# Patient Record
Sex: FEMALE | Race: WHITE | URBAN - METROPOLITAN AREA
[De-identification: names, ages, dates, MRNs, and addresses within clinical notes are randomized per-mention and may not be internally consistent; named-entity substitution may affect disease eponyms.]

---

## 2017-05-15 ENCOUNTER — IMPORTED ENCOUNTER (OUTPATIENT)
Dept: URBAN - METROPOLITAN AREA CLINIC 50 | Facility: CLINIC | Age: 77
End: 2017-05-15

## 2017-11-06 ENCOUNTER — IMPORTED ENCOUNTER (OUTPATIENT)
Dept: URBAN - METROPOLITAN AREA CLINIC 50 | Facility: CLINIC | Age: 77
End: 2017-11-06

## 2018-03-09 ENCOUNTER — IMPORTED ENCOUNTER (OUTPATIENT)
Dept: URBAN - METROPOLITAN AREA CLINIC 50 | Facility: CLINIC | Age: 78
End: 2018-03-09

## 2018-07-20 ENCOUNTER — IMPORTED ENCOUNTER (OUTPATIENT)
Dept: URBAN - METROPOLITAN AREA CLINIC 50 | Facility: CLINIC | Age: 78
End: 2018-07-20

## 2019-01-10 ENCOUNTER — IMPORTED ENCOUNTER (OUTPATIENT)
Dept: URBAN - METROPOLITAN AREA CLINIC 50 | Facility: CLINIC | Age: 79
End: 2019-01-10

## 2019-01-25 ENCOUNTER — IMPORTED ENCOUNTER (OUTPATIENT)
Dept: URBAN - METROPOLITAN AREA CLINIC 50 | Facility: CLINIC | Age: 79
End: 2019-01-25

## 2019-07-02 ENCOUNTER — IMPORTED ENCOUNTER (OUTPATIENT)
Dept: URBAN - METROPOLITAN AREA CLINIC 50 | Facility: CLINIC | Age: 79
End: 2019-07-02

## 2019-07-02 NOTE — PATIENT DISCUSSION
"""s/p ECP."" ""Reassured patient that intraocular pressures (IOPs) are at target levels and other ocular findings are stable. Continue present management and/or medication(s).  Follow up as directed. """

## 2020-01-07 ENCOUNTER — IMPORTED ENCOUNTER (OUTPATIENT)
Dept: URBAN - METROPOLITAN AREA CLINIC 50 | Facility: CLINIC | Age: 80
End: 2020-01-07

## 2020-02-04 ENCOUNTER — IMPORTED ENCOUNTER (OUTPATIENT)
Dept: URBAN - METROPOLITAN AREA CLINIC 50 | Facility: CLINIC | Age: 80
End: 2020-02-04

## 2020-03-11 NOTE — PATIENT DISCUSSION
Rx Doxycycline 100 mg PO q 12 hrs for 2 weeks.  Rx Maxitrol ointment bid as directed.  Rx Warm compresses qid.

## 2020-04-14 NOTE — PROCEDURE NOTE: CLINICAL
PROCEDURE NOTE: Bandage Contact Lens #1 OS. Diagnosis: Corneal Abrasion. A therapeutic soft contact lens of the of the appropriate size and base curve was selected and then applied to the cornea. The lens fit well and moved appropriately. Krysta Olivas

## 2020-04-16 NOTE — PATIENT DISCUSSION
Advised to call immediately if worsening eye pain or loss of vision. AT every hour for now and then add priya 128 ARIS at night. removed BCL today. call with any changes.

## 2020-06-30 NOTE — PROCEDURE NOTE: CLINICAL
PROCEDURE NOTE: SLT #2 OU. Diagnosis: Glaucoma Suspect, High Risk. Anesthesia: Topical. Prep: Alphagan 0.15%. Prior to treatment, risks/benefits/alternatives discussed including infection, loss of vision, hemorrhage, cataract, glaucoma, retinal tears or detachment. Lens:  SLT laser lens with goniosol. Power: 1.2/1.2mJ. Total applications: 741/34. Application 131/675 degrees. Patient tolerated procedure well. There were no complications. Post-op instructions given. Post-op IOP = 15/13 mmHg. Rubia Main PROCEDURE NOTE: SLT OU. Diagnosis: Glaucoma Suspect, High Risk. Prior to treatment, risks/benefits/alternatives discussed including infection, loss of vision, hemorrhage, cataract, glaucoma, retinal tears or detachment. Lens:  SLT laser lens with goniosol. Power: *mJ. Total applications: *. Application * degrees. Patient tolerated procedure well. There were no complications. Post-op instructions given. Post-op IOP = * mmHg. Rubia Main

## 2020-08-06 ENCOUNTER — IMPORTED ENCOUNTER (OUTPATIENT)
Dept: URBAN - METROPOLITAN AREA CLINIC 50 | Facility: CLINIC | Age: 80
End: 2020-08-06

## 2020-08-18 ENCOUNTER — IMPORTED ENCOUNTER (OUTPATIENT)
Dept: URBAN - METROPOLITAN AREA CLINIC 50 | Facility: CLINIC | Age: 80
End: 2020-08-18

## 2020-08-20 ENCOUNTER — IMPORTED ENCOUNTER (OUTPATIENT)
Dept: URBAN - METROPOLITAN AREA CLINIC 50 | Facility: CLINIC | Age: 80
End: 2020-08-20

## 2021-02-01 NOTE — PROCEDURE NOTE: CLINICAL
PROCEDURE NOTE: Punctal Plugs, 0.4 mm Quintess Dissolvable (37980U, ) #1 Left Lower Lid. Prior to treatment, the risks/benefits/alternatives were discussed. The patient wished to proceed with procedure. Temporary collagen plugs were inserted. Patient tolerated procedure well. There were no complications. Post procedure instructions given. Diya Harding PROCEDURE NOTE: Punctal Plugs, Quintess Dissolvable ([id number], [id number]) #1 Right Lower Lid. Prior to treatment, the risks/benefits/alternatives were discussed. The patient wished to proceed with procedure. Temporary collagen plugs were inserted. Patient tolerated procedure well. There were no complications. Post procedure instructions given. Diya Schulenburg

## 2021-02-18 ENCOUNTER — IMPORTED ENCOUNTER (OUTPATIENT)
Dept: URBAN - METROPOLITAN AREA CLINIC 50 | Facility: CLINIC | Age: 81
End: 2021-02-18

## 2021-03-09 ENCOUNTER — IMPORTED ENCOUNTER (OUTPATIENT)
Dept: URBAN - METROPOLITAN AREA CLINIC 50 | Facility: CLINIC | Age: 81
End: 2021-03-09

## 2021-03-15 ENCOUNTER — IMPORTED ENCOUNTER (OUTPATIENT)
Dept: URBAN - METROPOLITAN AREA CLINIC 50 | Facility: CLINIC | Age: 81
End: 2021-03-15

## 2021-04-18 ASSESSMENT — TONOMETRY
OS_IOP_MMHG: 20
OD_IOP_MMHG: 24
OS_IOP_MMHG: 15
OD_IOP_MMHG: 19
OS_IOP_MMHG: 15
OD_IOP_MMHG: 16
OS_IOP_MMHG: 18
OS_IOP_MMHG: 16
OD_IOP_MMHG: 19
OD_IOP_MMHG: 16
OD_IOP_MMHG: 20
OD_IOP_MMHG: 18
OD_IOP_MMHG: 19
OS_IOP_MMHG: 14
OS_IOP_MMHG: 19
OS_IOP_MMHG: 20
OD_IOP_MMHG: 17
OD_IOP_MMHG: 18
OS_IOP_MMHG: 18
OS_IOP_MMHG: 18
OS_IOP_MMHG: 17
OS_IOP_MMHG: 17
OS_IOP_MMHG: 19
OD_IOP_MMHG: 19
OS_IOP_MMHG: 20
OD_IOP_MMHG: 20
OS_IOP_MMHG: 17
OD_IOP_MMHG: 18
OS_IOP_MMHG: 17
OD_IOP_MMHG: 17
OS_IOP_MMHG: 20
OD_IOP_MMHG: 22
OS_IOP_MMHG: 13
OD_IOP_MMHG: 20
OD_IOP_MMHG: 18
OD_IOP_MMHG: 15
OS_IOP_MMHG: 17
OS_IOP_MMHG: 19
OS_IOP_MMHG: 18
OD_IOP_MMHG: 21
OD_IOP_MMHG: 23
OD_IOP_MMHG: 18
OD_IOP_MMHG: 19
OD_IOP_MMHG: 15
OS_IOP_MMHG: 18
OS_IOP_MMHG: 19
OS_IOP_MMHG: 16

## 2021-04-18 ASSESSMENT — PACHYMETRY
OD_CT_UM: 528
OD_CT_UM: 528
OS_CT_UM: 511
OD_CT_UM: 528
OS_CT_UM: 511
OD_CT_UM: 528
OS_CT_UM: 511
OD_CT_UM: 528
OS_CT_UM: 511
OD_CT_UM: 528
OD_CT_UM: 528
OS_CT_UM: 511
OD_CT_UM: 528
OS_CT_UM: 511
OD_CT_UM: 528
OS_CT_UM: 511

## 2021-04-18 ASSESSMENT — VISUAL ACUITY
OS_SC: 20/30
OS_SC: 20/30
OD_SC: 20/30-1
OD_CC: J1+@ 16 IN
OS_SC: 20/30
OD_SC: 20/25-
OD_SC: 20/30
OD_CC: J1+
OS_SC: 20/30+1
OS_SC: 20/40
OS_CC: J1+@ 16 IN
OD_SC: 20/30
OD_SC: 20/25
OD_CC: J1
OS_SC: 20/30+
OS_SC: 20/30-1
OD_SC: 20/25
OS_PH: 20/25-
OD_SC: 20/30
OS_CC: J1
OS_SC: 20/30
OS_PH: 20/30+2
OS_CC: J1+
OD_PH: 20/30+
OS_SC: 20/30
OD_SC: 20/40
OD_SC: 20/30
OS_SC: 20/30+2
OS_CC: J2@ 13 IN
OD_CC: J2@ 13 IN
OD_CC: J1
OD_SC: 20/25-1
OS_CC: J1
OD_SC: 20/30
OS_SC: 20/30

## 2021-08-09 NOTE — PROCEDURE NOTE: CLINICAL
PROCEDURE NOTE: Punctal Plugs, 0.4 mm Quintess Dissolvable ([id number], ) #2 Left Lower Lid. Prior to treatment, the risks/benefits/alternatives were discussed. The patient wished to proceed with procedure. Temporary collagen plugs were inserted. Patient tolerated procedure well. There were no complications. Post procedure instructions given. Samanta Umana PROCEDURE NOTE: Punctal Plugs, 0.4 mm Quintess Dissolvable ([id number], ) #2 Right Lower Lid. Prior to treatment, the risks/benefits/alternatives were discussed. The patient wished to proceed with procedure. Temporary collagen plugs were inserted. Patient tolerated procedure well. There were no complications. Post procedure instructions given. Samanta Umana

## 2021-08-11 ENCOUNTER — PREPPED CHART (OUTPATIENT)
Dept: URBAN - METROPOLITAN AREA CLINIC 49 | Facility: CLINIC | Age: 81
End: 2021-08-11

## 2021-08-12 ENCOUNTER — 6 MONTH COMPREHENSIVE EXAM (OUTPATIENT)
Dept: URBAN - METROPOLITAN AREA CLINIC 49 | Facility: CLINIC | Age: 81
End: 2021-08-12

## 2021-08-12 DIAGNOSIS — H40.1131: ICD-10-CM

## 2021-08-12 DIAGNOSIS — D31.31: ICD-10-CM

## 2021-08-12 PROCEDURE — 92133 CPTRZD OPH DX IMG PST SGM ON: CPT

## 2021-08-12 PROCEDURE — 92014 COMPRE OPH EXAM EST PT 1/>: CPT

## 2021-08-12 PROCEDURE — 92083 EXTENDED VISUAL FIELD XM: CPT

## 2021-08-12 ASSESSMENT — VISUAL ACUITY
OS_SC: 20/40+2
OD_SC: 20/40+2
OU_SC: 20/30-1

## 2021-08-12 ASSESSMENT — TONOMETRY
OD_IOP_MMHG: 17
OS_IOP_MMHG: 17
OS_IOP_MMHG: 19
OD_IOP_MMHG: 18

## 2021-08-24 ENCOUNTER — DIAGNOSTIC TESTING ONLY (OUTPATIENT)
Dept: URBAN - METROPOLITAN AREA CLINIC 48 | Facility: CLINIC | Age: 81
End: 2021-08-24

## 2021-08-24 DIAGNOSIS — H47.233: ICD-10-CM

## 2021-08-24 PROCEDURE — 92273 FULL FIELD ERG W/I&R: CPT

## 2021-09-23 ENCOUNTER — 1 MONTH FOLLOW-UP (OUTPATIENT)
Dept: URBAN - METROPOLITAN AREA CLINIC 49 | Facility: CLINIC | Age: 81
End: 2021-09-23

## 2021-09-23 DIAGNOSIS — H40.1131: ICD-10-CM

## 2021-09-23 PROCEDURE — 92012 INTRM OPH EXAM EST PATIENT: CPT

## 2021-09-23 ASSESSMENT — TONOMETRY
OD_IOP_MMHG: 17
OS_IOP_MMHG: 15
OD_IOP_MMHG: 16
OS_IOP_MMHG: 17

## 2021-09-23 ASSESSMENT — VISUAL ACUITY
OS_SC: 20/30
OD_SC: 20/30

## 2022-02-03 ENCOUNTER — FOLLOW UP (OUTPATIENT)
Dept: URBAN - METROPOLITAN AREA CLINIC 49 | Facility: CLINIC | Age: 82
End: 2022-02-03

## 2022-02-03 DIAGNOSIS — H40.1131: ICD-10-CM

## 2022-02-03 PROCEDURE — 92020 GONIOSCOPY: CPT

## 2022-02-03 PROCEDURE — 92012 INTRM OPH EXAM EST PATIENT: CPT

## 2022-02-03 ASSESSMENT — VISUAL ACUITY
OD_SC: 20/30-
OS_SC: 20/30-

## 2022-02-03 ASSESSMENT — TONOMETRY
OS_IOP_MMHG: 18
OS_IOP_MMHG: 16
OD_IOP_MMHG: 16
OD_IOP_MMHG: 17

## 2022-06-28 ENCOUNTER — DIAGNOSTICS ONLY (OUTPATIENT)
Dept: URBAN - METROPOLITAN AREA CLINIC 49 | Facility: CLINIC | Age: 82
End: 2022-06-28

## 2022-06-28 DIAGNOSIS — H40.1131: ICD-10-CM

## 2022-06-28 PROCEDURE — 92083 EXTENDED VISUAL FIELD XM: CPT

## 2022-06-28 PROCEDURE — 92133 CPTRZD OPH DX IMG PST SGM ON: CPT

## 2022-08-10 NOTE — PROCEDURE NOTE: CLINICAL
PROCEDURE NOTE: Punctal Plugs, 0.3 mm Collagen #1 Left Lower Lid. Prior to treatment, the risks/benefits/alternatives were discussed. The patient wished to proceed with procedure. One drop of proparacaine was placed and the a drop of lidocaine gel was placed over the puncta. Puncta was dilated with punctal dilator. Placed a collagen plug. Size/location of plugs inserted: *. Patient tolerated procedure well. There were no complications. Post-op instructions given. Erica Bucio PROCEDURE NOTE: Punctal Plugs, 0.3 mm Collagen #1 Right Lower Lid. Prior to treatment, the risks/benefits/alternatives were discussed. The patient wished to proceed with procedure. One drop of proparacaine was placed and the a drop of lidocaine gel was placed over the puncta. Puncta was dilated with punctal dilator. Placed a collagen plug. Size/location of plugs inserted: *. Patient tolerated procedure well. There were no complications. Post-op instructions given. Erica Bucio

## 2022-08-11 ENCOUNTER — COMPREHENSIVE EXAM (OUTPATIENT)
Dept: URBAN - METROPOLITAN AREA CLINIC 49 | Facility: CLINIC | Age: 82
End: 2022-08-11

## 2022-08-11 DIAGNOSIS — H40.1131: ICD-10-CM

## 2022-08-11 DIAGNOSIS — H47.012: ICD-10-CM

## 2022-08-11 DIAGNOSIS — D31.31: ICD-10-CM

## 2022-08-11 DIAGNOSIS — H35.3131: ICD-10-CM

## 2022-08-11 PROCEDURE — 92134 CPTRZ OPH DX IMG PST SGM RTA: CPT

## 2022-08-11 PROCEDURE — 92014 COMPRE OPH EXAM EST PT 1/>: CPT

## 2022-08-11 ASSESSMENT — TONOMETRY
OS_IOP_MMHG: 14
OS_IOP_MMHG: 16
OD_IOP_MMHG: 15
OD_IOP_MMHG: 14

## 2022-08-11 ASSESSMENT — VISUAL ACUITY
OD_SC: 20/30-1
OD_GLARE: 20/40
OD_GLARE: 20/200
OU_SC: J2
OS_SC: 20/30
OS_GLARE: 20/100
OS_GLARE: 20/30

## 2022-08-11 NOTE — PATIENT DISCUSSION
The IOP is in the target range. Continue current Latanoprost and Timolol drop therapy. Follow up in 6 months for IOP check.

## 2022-08-11 NOTE — PATIENT DISCUSSION
Patient does not have high blood pressure or diabetes. Patient was seen by PCP yesterday and her labs were normal. Slight APD OS noted today. Slight optic nerve hyperemia OS. No decrease in vision. Patient denies pain. No tenderness to palpation on temporal scalp. Discussed the patient possible need for steroids. Will re-evaluate in 1 week. Will see patient un dilated then will be dilated after un dilated view with Dr. Walsh Drop Schedule [facility] ASAP.

## 2022-08-18 ENCOUNTER — FOLLOW UP (OUTPATIENT)
Dept: URBAN - METROPOLITAN AREA CLINIC 49 | Facility: CLINIC | Age: 82
End: 2022-08-18

## 2022-08-18 DIAGNOSIS — H40.1131: ICD-10-CM

## 2022-08-18 DIAGNOSIS — H47.012: ICD-10-CM

## 2022-08-18 DIAGNOSIS — H26.493: ICD-10-CM

## 2022-08-18 PROCEDURE — 92014 COMPRE OPH EXAM EST PT 1/>: CPT

## 2022-08-18 PROCEDURE — 66821 AFTER CATARACT LASER SURGERY: CPT

## 2022-08-18 PROCEDURE — 92083 EXTENDED VISUAL FIELD XM: CPT

## 2022-08-18 ASSESSMENT — VISUAL ACUITY
OS_SC: 20/30-1
OD_SC: 20/30-1

## 2022-08-18 ASSESSMENT — TONOMETRY
OD_IOP_MMHG: 14
OS_IOP_MMHG: 14
OS_IOP_MMHG: 16
OD_IOP_MMHG: 15

## 2022-08-18 NOTE — PROCEDURE NOTE: CLINICAL
PROCEDURE NOTE: YAG Capsulotomy OS. Diagnosis: Posterior Capsular Opacification (PCO). Prep: Mydriacil 1% and Phenylephrine 2.5%. Prior to treatment, the risks/benefits/alternatives were discussed. The patient wished to proceed with procedure. Consent was signed. Proparacaine and brominidine were placed into the operative eye after the eye was dilated. Power = 1.1mJ. Number of pulses = 62. Patient tolerated procedure well and there were no complications. Post Laser instructions given. Jenni Beebe

## 2022-08-18 NOTE — PATIENT DISCUSSION
PCO (NO [place]):  PCO is not visually significant. Educated patient on signs and symptoms of PCO. Continue to monitor at this time.

## 2022-09-08 ENCOUNTER — POST-OP (OUTPATIENT)
Dept: URBAN - METROPOLITAN AREA CLINIC 49 | Facility: CLINIC | Age: 82
End: 2022-09-08

## 2022-09-08 DIAGNOSIS — H26.491: ICD-10-CM

## 2022-09-08 PROCEDURE — 66821 AFTER CATARACT LASER SURGERY: CPT

## 2022-09-08 ASSESSMENT — VISUAL ACUITY
OS_SC: 20/30-1
OU_SC: 20/40+2
OD_SC: 20/40+2

## 2022-09-08 ASSESSMENT — TONOMETRY
OD_IOP_MMHG: 15
OS_IOP_MMHG: 13
OS_IOP_MMHG: 15
OD_IOP_MMHG: 14

## 2022-09-08 NOTE — PATIENT DISCUSSION
PCO ([id number] [place] [id number]): Visually significant PCO present on exam today. Recommend YAG laser capsulotomy to improve vision and decrease glare symptoms. RBAs of procedure discussed. Patient agrees and wishes to proceed.

## 2022-09-08 NOTE — PROCEDURE NOTE: CLINICAL
PROCEDURE NOTE: YAG Capsulotomy OD. Diagnosis: Posterior Capsular Opacification (PCO). Prep: Mydriacil 1% and Phenylephrine 2.5%. Prior to treatment, the risks/benefits/alternatives were discussed. The patient wished to proceed with procedure. Consent was signed. Proparacaine and brominidine were placed into the operative eye after the eye was dilated. Power = 1.2mJ. Number of pulses = 60. Patient tolerated procedure well and there were no complications. Post Laser instructions given. Jessica Chirinos

## 2022-09-08 NOTE — PATIENT DISCUSSION
Discussed BP Control, UV protection and dark leafy green vegetables. work related/environmental exposure

## 2022-09-22 ENCOUNTER — POST-OP (OUTPATIENT)
Dept: URBAN - METROPOLITAN AREA CLINIC 49 | Facility: CLINIC | Age: 82
End: 2022-09-22

## 2022-09-22 DIAGNOSIS — Z98.890: ICD-10-CM

## 2022-09-22 DIAGNOSIS — H40.1131: ICD-10-CM

## 2022-09-22 PROCEDURE — 92015 DETERMINE REFRACTIVE STATE: CPT

## 2022-09-22 PROCEDURE — 99024 POSTOP FOLLOW-UP VISIT: CPT

## 2022-09-22 ASSESSMENT — TONOMETRY
OD_IOP_MMHG: 13
OS_IOP_MMHG: 16
OS_IOP_MMHG: 14
OD_IOP_MMHG: 14

## 2022-09-22 ASSESSMENT — VISUAL ACUITY
OS_SC: 20/30-2
OD_SC: 20/30-1

## 2023-09-14 ENCOUNTER — FOLLOW UP (OUTPATIENT)
Dept: URBAN - METROPOLITAN AREA CLINIC 49 | Facility: LOCATION | Age: 83
End: 2023-09-14

## 2023-09-14 DIAGNOSIS — H40.1131: ICD-10-CM

## 2023-09-14 DIAGNOSIS — H10.13: ICD-10-CM

## 2023-09-14 PROCEDURE — 92133 CPTRZD OPH DX IMG PST SGM ON: CPT

## 2023-09-14 PROCEDURE — 99213 OFFICE O/P EST LOW 20 MIN: CPT

## 2023-09-14 PROCEDURE — 92083 EXTENDED VISUAL FIELD XM: CPT

## 2023-09-14 RX ORDER — DORZOLAMIDE 20 MG/ML
1 SOLUTION/ DROPS OPHTHALMIC TWICE A DAY
Start: 2023-09-14

## 2023-09-14 RX ORDER — OLOPATADINE HYDROCHLORIDE 2 MG/ML: 1 SOLUTION OPHTHALMIC EVERY MORNING

## 2023-09-14 ASSESSMENT — VISUAL ACUITY
OU_SC: 20/40
OS_SC: 20/40
OD_SC: 20/40

## 2023-09-14 ASSESSMENT — TONOMETRY
OS_IOP_MMHG: 13
OD_IOP_MMHG: 15
OD_IOP_MMHG: 16
OS_IOP_MMHG: 15

## 2023-12-14 ENCOUNTER — COMPREHENSIVE EXAM (OUTPATIENT)
Dept: URBAN - METROPOLITAN AREA CLINIC 49 | Facility: LOCATION | Age: 83
End: 2023-12-14

## 2023-12-14 DIAGNOSIS — D31.31: ICD-10-CM

## 2023-12-14 DIAGNOSIS — H40.1131: ICD-10-CM

## 2023-12-14 DIAGNOSIS — H47.012: ICD-10-CM

## 2023-12-14 DIAGNOSIS — H35.3131: ICD-10-CM

## 2023-12-14 PROCEDURE — 99214 OFFICE O/P EST MOD 30 MIN: CPT

## 2023-12-14 PROCEDURE — 92134 CPTRZ OPH DX IMG PST SGM RTA: CPT

## 2023-12-14 ASSESSMENT — VISUAL ACUITY
OS_SC: 20/40
OD_SC: 20/40-

## 2023-12-14 ASSESSMENT — TONOMETRY
OS_IOP_MMHG: 15
OS_IOP_MMHG: 13
OD_IOP_MMHG: 15
OD_IOP_MMHG: 14

## 2024-06-08 NOTE — PATIENT DISCUSSION
"""Moderate Primary Open Angle Glaucoma Patient requests all Lab, Cardiology, and Radiology Results on their Discharge Instructions

## 2024-07-15 ENCOUNTER — DIAGNOSTICS ONLY (OUTPATIENT)
Dept: URBAN - METROPOLITAN AREA CLINIC 49 | Facility: LOCATION | Age: 84
End: 2024-07-15

## 2024-07-15 ENCOUNTER — EMERGENCY VISIT (OUTPATIENT)
Dept: URBAN - METROPOLITAN AREA CLINIC 49 | Facility: LOCATION | Age: 84
End: 2024-07-15

## 2024-07-15 DIAGNOSIS — H40.1131: ICD-10-CM

## 2024-07-15 PROCEDURE — 92083 EXTENDED VISUAL FIELD XM: CPT

## 2024-07-15 PROCEDURE — 99212 OFFICE O/P EST SF 10 MIN: CPT

## 2024-07-15 PROCEDURE — 92133 CPTRZD OPH DX IMG PST SGM ON: CPT

## 2024-07-15 ASSESSMENT — VISUAL ACUITY
OS_SC: 20/50
OD_SC: 20/50

## 2024-07-15 ASSESSMENT — TONOMETRY
OS_IOP_MMHG: 12
OS_IOP_MMHG: 14
OD_IOP_MMHG: 14
OD_IOP_MMHG: 13

## 2024-07-18 ENCOUNTER — FOLLOW UP (OUTPATIENT)
Dept: URBAN - METROPOLITAN AREA CLINIC 49 | Facility: LOCATION | Age: 84
End: 2024-07-18

## 2024-07-18 DIAGNOSIS — H10.13: ICD-10-CM

## 2024-07-18 DIAGNOSIS — H40.1131: ICD-10-CM

## 2024-07-18 PROCEDURE — 99213 OFFICE O/P EST LOW 20 MIN: CPT

## 2024-07-18 RX ORDER — PREDNISOLONE ACETATE 10 MG/ML
1 SUSPENSION/ DROPS OPHTHALMIC
Start: 2024-07-18

## 2024-07-18 ASSESSMENT — TONOMETRY
OD_IOP_MMHG: 16
OS_IOP_MMHG: 16
OD_IOP_MMHG: 17
OS_IOP_MMHG: 14

## 2024-07-18 ASSESSMENT — VISUAL ACUITY
OU_SC: 20/30-1
OS_SC: 20/40+1
OD_SC: 20/30-1

## 2025-01-23 ENCOUNTER — COMPREHENSIVE EXAM (OUTPATIENT)
Age: 85
End: 2025-01-23

## 2025-01-23 DIAGNOSIS — H52.4: ICD-10-CM

## 2025-01-23 DIAGNOSIS — H47.012: ICD-10-CM

## 2025-01-23 DIAGNOSIS — D31.31: ICD-10-CM

## 2025-01-23 DIAGNOSIS — H40.1131: ICD-10-CM

## 2025-01-23 DIAGNOSIS — H35.3131: ICD-10-CM

## 2025-01-23 PROCEDURE — 99214 OFFICE O/P EST MOD 30 MIN: CPT

## 2025-01-23 PROCEDURE — 92134 CPTRZ OPH DX IMG PST SGM RTA: CPT
